# Patient Record
Sex: FEMALE | Race: BLACK OR AFRICAN AMERICAN | NOT HISPANIC OR LATINO | Employment: PART TIME | URBAN - METROPOLITAN AREA
[De-identification: names, ages, dates, MRNs, and addresses within clinical notes are randomized per-mention and may not be internally consistent; named-entity substitution may affect disease eponyms.]

---
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
## 2023-02-23 ENCOUNTER — OFFICE VISIT (OUTPATIENT)
Dept: FAMILY MEDICINE | Facility: CLINIC | Age: 18
End: 2023-02-23
Payer: MEDICAID

## 2023-02-23 VITALS
HEIGHT: 66 IN | RESPIRATION RATE: 20 BRPM | SYSTOLIC BLOOD PRESSURE: 106 MMHG | HEART RATE: 58 BPM | OXYGEN SATURATION: 100 % | BODY MASS INDEX: 20.3 KG/M2 | TEMPERATURE: 98 F | DIASTOLIC BLOOD PRESSURE: 67 MMHG | WEIGHT: 126.31 LBS

## 2023-02-23 DIAGNOSIS — B00.2 ORAL HERPES: ICD-10-CM

## 2023-02-23 DIAGNOSIS — E28.2 PCOS (POLYCYSTIC OVARIAN SYNDROME): ICD-10-CM

## 2023-02-23 DIAGNOSIS — Z00.00 WELLNESS EXAMINATION: Primary | ICD-10-CM

## 2023-02-23 PROBLEM — Q21.12 PATENT FORAMEN OVALE WITH ATRIAL SEPTAL ANEURYSM: Status: RESOLVED | Noted: 2022-11-01 | Resolved: 2023-02-23

## 2023-02-23 PROBLEM — I25.3 PATENT FORAMEN OVALE WITH ATRIAL SEPTAL ANEURYSM: Status: RESOLVED | Noted: 2022-11-01 | Resolved: 2023-02-23

## 2023-02-23 LAB
ALBUMIN SERPL-MCNC: 3.8 G/DL (ref 3.5–5)
ALBUMIN/GLOB SERPL: 1.1 RATIO (ref 1.1–2)
ALP SERPL-CCNC: 53 UNIT/L (ref 40–150)
ALT SERPL-CCNC: 6 UNIT/L (ref 0–55)
APPEARANCE UR: CLEAR
AST SERPL-CCNC: 16 UNIT/L (ref 5–34)
BACTERIA #/AREA URNS AUTO: ABNORMAL /HPF
BASOPHILS # BLD AUTO: 0.06 X10(3)/MCL (ref 0–0.2)
BASOPHILS NFR BLD AUTO: 0.7 %
BILIRUB UR QL STRIP.AUTO: NEGATIVE MG/DL
BILIRUBIN DIRECT+TOT PNL SERPL-MCNC: 0.6 MG/DL
BUN SERPL-MCNC: 9.3 MG/DL (ref 7–18.7)
C TRACH DNA SPEC QL NAA+PROBE: NOT DETECTED
CALCIUM SERPL-MCNC: 9 MG/DL (ref 8.4–10.2)
CHLORIDE SERPL-SCNC: 105 MMOL/L (ref 98–107)
CHOLEST SERPL-MCNC: 152 MG/DL
CHOLEST/HDLC SERPL: 2 {RATIO} (ref 0–5)
CO2 SERPL-SCNC: 25 MMOL/L (ref 22–29)
COLOR UR AUTO: ABNORMAL
CREAT SERPL-MCNC: 0.84 MG/DL (ref 0.55–1.02)
EOSINOPHIL # BLD AUTO: 0.19 X10(3)/MCL (ref 0–0.9)
EOSINOPHIL NFR BLD AUTO: 2.2 %
ERYTHROCYTE [DISTWIDTH] IN BLOOD BY AUTOMATED COUNT: 12.2 % (ref 11.5–17)
EST. AVERAGE GLUCOSE BLD GHB EST-MCNC: 93.9 MG/DL
GFR SERPLBLD CREATININE-BSD FMLA CKD-EPI: >60 MLS/MIN/1.73/M2
GLOBULIN SER-MCNC: 3.4 GM/DL (ref 2.4–3.5)
GLUCOSE SERPL-MCNC: 77 MG/DL (ref 74–100)
GLUCOSE UR QL STRIP.AUTO: NORMAL MG/DL
HAV IGM SERPL QL IA: NONREACTIVE
HBA1C MFR BLD: 4.9 %
HBV CORE IGM SERPL QL IA: NONREACTIVE
HBV SURFACE AG SERPL QL IA: NONREACTIVE
HCT VFR BLD AUTO: 37.9 % (ref 37–47)
HCV AB SERPL QL IA: NONREACTIVE
HDLC SERPL-MCNC: 61 MG/DL (ref 35–60)
HGB BLD-MCNC: 12.3 G/DL (ref 12–16)
HIV 1+2 AB+HIV1 P24 AG SERPL QL IA: NONREACTIVE
HYALINE CASTS #/AREA URNS LPF: ABNORMAL /LPF
IMM GRANULOCYTES # BLD AUTO: 0.02 X10(3)/MCL (ref 0–0.04)
IMM GRANULOCYTES NFR BLD AUTO: 0.2 %
KETONES UR QL STRIP.AUTO: ABNORMAL MG/DL
LDLC SERPL CALC-MCNC: 71 MG/DL (ref 50–140)
LEUKOCYTE ESTERASE UR QL STRIP.AUTO: NEGATIVE UNIT/L
LYMPHOCYTES # BLD AUTO: 1.95 X10(3)/MCL (ref 0.6–4.6)
LYMPHOCYTES NFR BLD AUTO: 22.7 %
MCH RBC QN AUTO: 29.3 PG
MCHC RBC AUTO-ENTMCNC: 32.5 G/DL (ref 33–36)
MCV RBC AUTO: 90.2 FL (ref 80–94)
MONOCYTES # BLD AUTO: 0.45 X10(3)/MCL (ref 0.1–1.3)
MONOCYTES NFR BLD AUTO: 5.2 %
MUCOUS THREADS URNS QL MICRO: ABNORMAL /LPF
N GONORRHOEA DNA SPEC QL NAA+PROBE: NOT DETECTED
NEUTROPHILS # BLD AUTO: 5.91 X10(3)/MCL (ref 2.1–9.2)
NEUTROPHILS NFR BLD AUTO: 69 %
NITRITE UR QL STRIP.AUTO: ABNORMAL
NRBC BLD AUTO-RTO: 0 %
PH UR STRIP.AUTO: 6 [PH]
PLATELET # BLD AUTO: 351 X10(3)/MCL (ref 130–400)
PMV BLD AUTO: 10.1 FL (ref 7.4–10.4)
POTASSIUM SERPL-SCNC: 3.5 MMOL/L (ref 3.5–5.1)
PROT SERPL-MCNC: 7.2 GM/DL (ref 6.4–8.3)
PROT UR QL STRIP.AUTO: NEGATIVE MG/DL
RBC # BLD AUTO: 4.2 X10(6)/MCL (ref 4.2–5.4)
RBC #/AREA URNS AUTO: ABNORMAL /HPF
RBC UR QL AUTO: NEGATIVE UNIT/L
SODIUM SERPL-SCNC: 138 MMOL/L (ref 136–145)
SP GR UR STRIP.AUTO: 1.02
SQUAMOUS #/AREA URNS LPF: ABNORMAL /HPF
T PALLIDUM AB SER QL: NONREACTIVE
T4 FREE SERPL-MCNC: 0.93 NG/DL (ref 0.7–1.48)
TRIGL SERPL-MCNC: 100 MG/DL (ref 37–140)
TSH SERPL-ACNC: 1.68 UIU/ML (ref 0.35–4.94)
UROBILINOGEN UR STRIP-ACNC: NORMAL MG/DL
VLDLC SERPL CALC-MCNC: 20 MG/DL
WBC # SPEC AUTO: 8.6 X10(3)/MCL (ref 4.5–11.5)
WBC #/AREA URNS AUTO: ABNORMAL /HPF

## 2023-02-23 PROCEDURE — 3008F PR BODY MASS INDEX (BMI) DOCUMENTED: ICD-10-PCS | Mod: CPTII,,, | Performed by: STUDENT IN AN ORGANIZED HEALTH CARE EDUCATION/TRAINING PROGRAM

## 2023-02-23 PROCEDURE — 1159F PR MEDICATION LIST DOCUMENTED IN MEDICAL RECORD: ICD-10-PCS | Mod: CPTII,,, | Performed by: STUDENT IN AN ORGANIZED HEALTH CARE EDUCATION/TRAINING PROGRAM

## 2023-02-23 PROCEDURE — 86780 TREPONEMA PALLIDUM: CPT | Performed by: STUDENT IN AN ORGANIZED HEALTH CARE EDUCATION/TRAINING PROGRAM

## 2023-02-23 PROCEDURE — 99395 PREV VISIT EST AGE 18-39: CPT | Mod: S$PBB,,, | Performed by: STUDENT IN AN ORGANIZED HEALTH CARE EDUCATION/TRAINING PROGRAM

## 2023-02-23 PROCEDURE — 99395 PR PREVENTIVE VISIT,EST,18-39: ICD-10-PCS | Mod: S$PBB,,, | Performed by: STUDENT IN AN ORGANIZED HEALTH CARE EDUCATION/TRAINING PROGRAM

## 2023-02-23 PROCEDURE — 81001 URINALYSIS AUTO W/SCOPE: CPT | Performed by: STUDENT IN AN ORGANIZED HEALTH CARE EDUCATION/TRAINING PROGRAM

## 2023-02-23 PROCEDURE — 87591 N.GONORRHOEAE DNA AMP PROB: CPT | Performed by: STUDENT IN AN ORGANIZED HEALTH CARE EDUCATION/TRAINING PROGRAM

## 2023-02-23 PROCEDURE — 3008F BODY MASS INDEX DOCD: CPT | Mod: CPTII,,, | Performed by: STUDENT IN AN ORGANIZED HEALTH CARE EDUCATION/TRAINING PROGRAM

## 2023-02-23 PROCEDURE — 3078F DIAST BP <80 MM HG: CPT | Mod: CPTII,,, | Performed by: STUDENT IN AN ORGANIZED HEALTH CARE EDUCATION/TRAINING PROGRAM

## 2023-02-23 PROCEDURE — 85025 COMPLETE CBC W/AUTO DIFF WBC: CPT | Performed by: STUDENT IN AN ORGANIZED HEALTH CARE EDUCATION/TRAINING PROGRAM

## 2023-02-23 PROCEDURE — 84443 ASSAY THYROID STIM HORMONE: CPT | Performed by: STUDENT IN AN ORGANIZED HEALTH CARE EDUCATION/TRAINING PROGRAM

## 2023-02-23 PROCEDURE — 3074F PR MOST RECENT SYSTOLIC BLOOD PRESSURE < 130 MM HG: ICD-10-PCS | Mod: CPTII,,, | Performed by: STUDENT IN AN ORGANIZED HEALTH CARE EDUCATION/TRAINING PROGRAM

## 2023-02-23 PROCEDURE — 3078F PR MOST RECENT DIASTOLIC BLOOD PRESSURE < 80 MM HG: ICD-10-PCS | Mod: CPTII,,, | Performed by: STUDENT IN AN ORGANIZED HEALTH CARE EDUCATION/TRAINING PROGRAM

## 2023-02-23 PROCEDURE — 99213 OFFICE O/P EST LOW 20 MIN: CPT | Mod: PBBFAC,PN | Performed by: STUDENT IN AN ORGANIZED HEALTH CARE EDUCATION/TRAINING PROGRAM

## 2023-02-23 PROCEDURE — 87389 HIV-1 AG W/HIV-1&-2 AB AG IA: CPT | Performed by: STUDENT IN AN ORGANIZED HEALTH CARE EDUCATION/TRAINING PROGRAM

## 2023-02-23 PROCEDURE — 83036 HEMOGLOBIN GLYCOSYLATED A1C: CPT | Performed by: STUDENT IN AN ORGANIZED HEALTH CARE EDUCATION/TRAINING PROGRAM

## 2023-02-23 PROCEDURE — 80053 COMPREHEN METABOLIC PANEL: CPT | Performed by: STUDENT IN AN ORGANIZED HEALTH CARE EDUCATION/TRAINING PROGRAM

## 2023-02-23 PROCEDURE — 84439 ASSAY OF FREE THYROXINE: CPT | Performed by: STUDENT IN AN ORGANIZED HEALTH CARE EDUCATION/TRAINING PROGRAM

## 2023-02-23 PROCEDURE — 36415 COLL VENOUS BLD VENIPUNCTURE: CPT | Performed by: STUDENT IN AN ORGANIZED HEALTH CARE EDUCATION/TRAINING PROGRAM

## 2023-02-23 PROCEDURE — 1159F MED LIST DOCD IN RCRD: CPT | Mod: CPTII,,, | Performed by: STUDENT IN AN ORGANIZED HEALTH CARE EDUCATION/TRAINING PROGRAM

## 2023-02-23 PROCEDURE — 80074 ACUTE HEPATITIS PANEL: CPT | Performed by: STUDENT IN AN ORGANIZED HEALTH CARE EDUCATION/TRAINING PROGRAM

## 2023-02-23 PROCEDURE — 80061 LIPID PANEL: CPT | Performed by: STUDENT IN AN ORGANIZED HEALTH CARE EDUCATION/TRAINING PROGRAM

## 2023-02-23 PROCEDURE — 3074F SYST BP LT 130 MM HG: CPT | Mod: CPTII,,, | Performed by: STUDENT IN AN ORGANIZED HEALTH CARE EDUCATION/TRAINING PROGRAM

## 2023-02-23 RX ORDER — ACYCLOVIR 400 MG/1
400 TABLET ORAL
Qty: 25 TABLET | Refills: 3 | Status: SHIPPED | OUTPATIENT
Start: 2023-02-23 | End: 2023-02-28

## 2023-02-23 NOTE — PROGRESS NOTES
Patient Name: Britney Urbina   : 2005  MRN: [id number]     Subjective:   Patient ID: Britney Urbina is a 17 y.o. female.    Chief Complaint:   Chief Complaint   Patient presents with    Follow-up     Thinks she has pcos        HPI: HPI  Birth Control- now established with gynecology. Is on OCP's. States that she thinks she has PCOS as cycles were never regular until on OCP's and now having excessive hair growth on chin and between breasts.       Genital Herpes  Diagnosed in [place] in 2020. No current outbreaks  States not interested in STI testing  This visit patient states that the herpes were no genital they were oral.     Syncope  Has has follow up with cardiology and pulm. Episodes are when she does not eat for long periods at a time or drink fluids.  Has been reminded to stay hydrated.       Marijuana use  Patient reports daily marijuana use  Patient declines cessation at this time  States that she does not feel anxious or depressed but admits to having a counselor that she received twila year of high school that she still continues with today  Declines medication or referral for other behavioral health services      ROS:  ROS   History:     Past Medical History:   Diagnosis Date    Herpes simplex virus (HSV) infection       History reviewed. No pertinent surgical history.  Family History   Problem Relation Age of Onset    Breast cancer Maternal Grandmother     Hypertension Father     Diabetes type II Mother     Bipolar disorder Mother     Schizophrenia Mother       Social History     Tobacco Use    Smoking status: Some Days     Types: Vaping with nicotine    Smokeless tobacco: Never   Substance and Sexual Activity    Alcohol use: Yes     Alcohol/week: 1.0 standard drink     Types: 1 Glasses of wine per week     Comment: monthly    Drug use: Yes     Frequency: 7.0 times per week     Types: Marijuana     Comment: daily    Sexual activity: Yes        Allergies: Review of patient's allergies  "indicates:  No Known Allergies  Objective:     Vitals:    02/23/23 0813   BP: 106/67   Pulse: (!) 58   Resp: 20   Temp: 98.1 °F (36.7 °C)   TempSrc: Oral   SpO2: 100%   Weight: 57.3 kg (126 lb 4.8 oz)   Height: 5' 6" (1.676 m)     Body mass index is 20.39 kg/m².     Physical Examination:   Physical Exam  Constitutional:       General: She is not in acute distress.  Eyes:      General: No scleral icterus.     Extraocular Movements: Extraocular movements intact.      Conjunctiva/sclera: Conjunctivae normal.      Pupils: Pupils are equal, round, and reactive to light.   Cardiovascular:      Rate and Rhythm: Normal rate and regular rhythm.      Heart sounds: No murmur heard.  Pulmonary:      Effort: Pulmonary effort is normal. No respiratory distress.      Breath sounds: No stridor. No wheezing or rhonchi.   Abdominal:      General: Bowel sounds are normal. There is no distension.      Palpations: Abdomen is soft.      Tenderness: There is no abdominal tenderness. There is no guarding.   Musculoskeletal:         General: No swelling. Normal range of motion.   Skin:     General: Skin is warm and dry.      Coloration: Skin is not jaundiced.      Findings: No rash.   Neurological:      Mental Status: She is alert.      Gait: Gait normal.   Psychiatric:         Thought Content: Thought content normal.       Assessment:     1. Wellness examination        Plan:     Problem List Items Addressed This Visit          ID    Oral herpes    Overview     A patient prescription for acyclovir 400 mg 5 times a day            Endocrine    PCOS (polycystic ovarian syndrome)    Overview     Patient meets criteria for PCOS with hirsutism and irregular periods  Patient had previously been on spironolactone but is no longer on medication blood pressure is 106/67 running A1c and will discuss with patient potential treatments moving forward  Also encouraged patient to call gynecology            Other    Wellness examination - Primary    " Overview     Labs as below         Relevant Orders    Hepatitis Panel, Acute    SYPHILIS ANTIBODY (WITH REFLEX RPR)    HIV 1/2 Ag/Ab (4th Gen)    CBC Auto Differential    Comprehensive Metabolic Panel    Lipid Panel    T4, Free    TSH    Urinalysis    Hemoglobin A1C    Chlamydia/GC, PCR      Problem List Items Addressed This Visit    None  Visit Diagnoses       Wellness examination    -  Primary    Relevant Orders    Hepatitis Panel, Acute    SYPHILIS ANTIBODY (WITH REFLEX RPR)    HIV 1/2 Ag/Ab (4th Gen)    CBC Auto Differential    Comprehensive Metabolic Panel    Lipid Panel    T4, Free    TSH    Urinalysis    Hemoglobin A1C    Chlamydia/GC, PCR           Follow up in about 2 months (around 4/23/2023) for lab results, Virtual Visit.

## 2023-02-24 LAB — PATH REV: NORMAL

## 2023-03-13 ENCOUNTER — OFFICE VISIT (OUTPATIENT)
Dept: FAMILY MEDICINE | Facility: CLINIC | Age: 18
End: 2023-03-13
Payer: MEDICAID

## 2023-03-13 DIAGNOSIS — R79.89 LOW VITAMIN D LEVEL: Primary | ICD-10-CM

## 2023-03-13 DIAGNOSIS — E28.2 PCOS (POLYCYSTIC OVARIAN SYNDROME): ICD-10-CM

## 2023-03-13 PROCEDURE — 99213 OFFICE O/P EST LOW 20 MIN: CPT | Mod: 95,,, | Performed by: STUDENT IN AN ORGANIZED HEALTH CARE EDUCATION/TRAINING PROGRAM

## 2023-03-13 PROCEDURE — 1159F MED LIST DOCD IN RCRD: CPT | Mod: CPTII,95,, | Performed by: STUDENT IN AN ORGANIZED HEALTH CARE EDUCATION/TRAINING PROGRAM

## 2023-03-13 PROCEDURE — 99213 PR OFFICE/OUTPT VISIT, EST, LEVL III, 20-29 MIN: ICD-10-PCS | Mod: 95,,, | Performed by: STUDENT IN AN ORGANIZED HEALTH CARE EDUCATION/TRAINING PROGRAM

## 2023-03-13 PROCEDURE — 1159F PR MEDICATION LIST DOCUMENTED IN MEDICAL RECORD: ICD-10-PCS | Mod: CPTII,95,, | Performed by: STUDENT IN AN ORGANIZED HEALTH CARE EDUCATION/TRAINING PROGRAM

## 2023-03-13 RX ORDER — ASPIRIN 325 MG
50000 TABLET, DELAYED RELEASE (ENTERIC COATED) ORAL
Qty: 12 CAPSULE | Refills: 0 | Status: SHIPPED | OUTPATIENT
Start: 2023-03-13 | End: 2023-05-30

## 2023-03-14 PROBLEM — R79.89 LOW VITAMIN D LEVEL: Status: ACTIVE | Noted: 2023-03-14

## 2023-03-14 NOTE — PROGRESS NOTES
Audio Only Telehealth Visit     The patient location is: home  The chief complaint leading to consultation is: lab results   Visit type: Virtual visit with audio only (telephone)  Total time spent with patient: 15 minutes   The reason for the audio only service rather than synchronous audio and video virtual visit was related to technical difficulties or patient preference/necessity.     Each patient to whom I provide medical services by telemedicine is:  (1) informed of the relationship between the physician and patient and the respective role of any other health care provider with respect to management of the patient; and (2) notified that they may decline to receive medical services by telemedicine and may withdraw from such care at any time. Patient verbally consented to receive this service via voice-only telephone call.       HPI:   Birth Control- now established with gynecology. Is on OCP's. States that she thinks she has PCOS as cycles were never regular until on OCP's and now having excessive hair growth on chin and between breasts.   A1c is not in insulin resistant range.       Genital Herpes  Diagnosed in [place] in December 2020. No current outbreaks  States not interested in STI testing  Patient now states that the herpes were no genital they were oral.     Syncope  Has has follow up with cardiology and pulm. Episodes are when she does not eat for long periods at a time or drink fluids.  Has been reminded to stay hydrated.         Marijuana use  Patient reports daily marijuana use  Patient declines cessation at this time  States that she does not feel anxious or depressed but admits to having a counselor that she received twila year of high school that she still continues with today  Declines medication or referral for other behavioral health services     Assessment and plan:      Problem List Items Addressed This Visit          Endocrine    PCOS (polycystic ovarian syndrome)    Overview     Patient  meets criteria for PCOS with hirsutism and irregular periods  Patient had previously been on spironolactone but is no longer on medicationReviewed remainder of labs with patient             Other    Low vitamin D level - Primary    Overview     Refilled vitamin D then explained to patient after this round she is to take a MTV         Relevant Medications    cholecalciferol, vitamin D3, 1,250 mcg (50,000 unit) capsule             Follow up in about 1 year (around 3/13/2024) for wellness exam and labs .                This service was not originating from a related E/M service provided within the previous 7 days nor will  to an E/M service or procedure within the next 24 hours or my soonest available appointment.  Prevailing standard of care was able to be met in this audio-only visit.

## 2023-05-29 PROBLEM — Z00.00 WELLNESS EXAMINATION: Status: RESOLVED | Noted: 2023-02-23 | Resolved: 2023-05-29

## 2023-09-26 ENCOUNTER — OFFICE VISIT (OUTPATIENT)
Dept: FAMILY MEDICINE | Facility: CLINIC | Age: 18
End: 2023-09-26
Payer: MEDICAID

## 2023-09-26 VITALS
BODY MASS INDEX: 17.65 KG/M2 | RESPIRATION RATE: 18 BRPM | WEIGHT: 109.81 LBS | SYSTOLIC BLOOD PRESSURE: 102 MMHG | TEMPERATURE: 98 F | HEIGHT: 66 IN | HEART RATE: 77 BPM | DIASTOLIC BLOOD PRESSURE: 66 MMHG | OXYGEN SATURATION: 100 %

## 2023-09-26 DIAGNOSIS — E28.2 PCOS (POLYCYSTIC OVARIAN SYNDROME): ICD-10-CM

## 2023-09-26 DIAGNOSIS — R55 SYNCOPE AND COLLAPSE: Primary | ICD-10-CM

## 2023-09-26 PROCEDURE — 3008F PR BODY MASS INDEX (BMI) DOCUMENTED: ICD-10-PCS | Mod: CPTII,,,

## 2023-09-26 PROCEDURE — 1159F PR MEDICATION LIST DOCUMENTED IN MEDICAL RECORD: ICD-10-PCS | Mod: CPTII,,,

## 2023-09-26 PROCEDURE — 99214 OFFICE O/P EST MOD 30 MIN: CPT | Mod: PBBFAC,PN

## 2023-09-26 PROCEDURE — 3044F HG A1C LEVEL LT 7.0%: CPT | Mod: CPTII,,,

## 2023-09-26 PROCEDURE — 1160F PR REVIEW ALL MEDS BY PRESCRIBER/CLIN PHARMACIST DOCUMENTED: ICD-10-PCS | Mod: CPTII,,,

## 2023-09-26 PROCEDURE — 1159F MED LIST DOCD IN RCRD: CPT | Mod: CPTII,,,

## 2023-09-26 PROCEDURE — 1160F RVW MEDS BY RX/DR IN RCRD: CPT | Mod: CPTII,,,

## 2023-09-26 PROCEDURE — 99214 OFFICE O/P EST MOD 30 MIN: CPT | Mod: S$PBB,,,

## 2023-09-26 PROCEDURE — 3008F BODY MASS INDEX DOCD: CPT | Mod: CPTII,,,

## 2023-09-26 PROCEDURE — 3078F DIAST BP <80 MM HG: CPT | Mod: CPTII,,,

## 2023-09-26 PROCEDURE — 3074F SYST BP LT 130 MM HG: CPT | Mod: CPTII,,,

## 2023-09-26 PROCEDURE — 3078F PR MOST RECENT DIASTOLIC BLOOD PRESSURE < 80 MM HG: ICD-10-PCS | Mod: CPTII,,,

## 2023-09-26 PROCEDURE — 99214 PR OFFICE/OUTPT VISIT, EST, LEVL IV, 30-39 MIN: ICD-10-PCS | Mod: S$PBB,,,

## 2023-09-26 PROCEDURE — 3044F PR MOST RECENT HEMOGLOBIN A1C LEVEL <7.0%: ICD-10-PCS | Mod: CPTII,,,

## 2023-09-26 PROCEDURE — 3074F PR MOST RECENT SYSTOLIC BLOOD PRESSURE < 130 MM HG: ICD-10-PCS | Mod: CPTII,,,

## 2023-09-26 RX ORDER — METFORMIN HYDROCHLORIDE 500 MG/1
TABLET ORAL
Qty: 141 TABLET | Refills: 0 | Status: SHIPPED | OUTPATIENT
Start: 2023-09-26 | End: 2023-11-09

## 2023-09-26 NOTE — ASSESSMENT & PLAN NOTE
Patient states that she has had external workup with Holter monitor, not available for review in chart.  Does endorse syncope surrounded by skipping meals.

## 2023-09-26 NOTE — PROGRESS NOTES
"  Patient Name: Britney Urbina     : 2005    MRN: [id number]     Subjective:     Patient ID: Britney Urbina is a 18 y.o. female.    Chief Complaint:   Chief Complaint   Patient presents with    [facility]     New patient. States "passed out" x1 month ago. States if she doesn't eat anything she passes out. States started in the 7th grade.         HPI: 2023: Episodes began about 13 years prior, occur randomly, states typically surrounding skipping meals. Patient has had prior episodes. There was complete loss of consciousness per patient. The episode was witnessed. Onset was over several seconds. Duration of the episode was a few minutes. There  was not a change of mental status after the event. Preceding/concomitant actions:skipping meals. Symptoms prior to event:lightheadedness and palpitations. There is not a personal history of heart disease e.g. ASHD/CAD, aortic stenosis/other valvular disease. There is not a personal history of pulmonary hypertension. There is not a family history of heart disease e.g. ASHD/CAD, aortic stenosis/other valvular disease. Patient denies chest pain, palpitations, and shortness of breath.  Patient denies fever, night sweats, chills, nausea, vomiting, diarrhea, constipation, weight loss, and changes in appetite.        ROS:       12 point review of systems conducted, negative except as stated in the history of present illness. See HPI for details.    History:     Past Medical History:   Diagnosis Date    Herpes simplex virus (HSV) infection         History reviewed. No pertinent surgical history.    Family History   Problem Relation Age of Onset    Breast cancer Maternal Grandmother     Hypertension Father     Diabetes type II Mother     Bipolar disorder Mother     Schizophrenia Mother         Social History     Tobacco Use    Smoking status: Every Day     Types: Vaping with nicotine    Smokeless tobacco: Never   Substance and Sexual Activity    Alcohol use: Not " "Currently     Alcohol/week: 1.0 standard drink of alcohol     Types: 1 Glasses of wine per week    Drug use: Yes     Frequency: 7.0 times per week     Types: Marijuana     Comment: daily    Sexual activity: Yes     Partners: Male       Current Outpatient Medications   Medication Instructions    acyclovir (ZOVIRAX) 400 mg, Oral, 5 times daily    ESTARYLLA 0.25-35 mg-mcg per tablet 1 tablet, Oral, Daily, Restart on Sunday after cycle.    iron-vit c-b12-folic acid (IRON 100 PLUS) Tab iron Take No date recorded No form recorded No frequency recorded No route recorded No set duration recorded No set duration amount recorded active No dosage strength recorded No dosage strength units of measure recorded    metFORMIN (GLUCOPHAGE) 500 MG tablet Take 1 tablet (500 mg total) by mouth daily with breakfast for 7 days, THEN 1 tablet (500 mg total) 2 (two) times daily with meals for 7 days, THEN 2 tablets (1,000 mg total) 2 (two) times daily with meals.    mirtazapine (REMERON) 15 mg, Oral        Review of patient's allergies indicates:  No Known Allergies    Objective:     Visit Vitals  /66 (BP Location: Left arm, Patient Position: Sitting)   Pulse 77   Temp 98.2 °F (36.8 °C) (Oral)   Resp 18   Ht 5' 6" (1.676 m)   Wt 49.8 kg (109 lb 12.8 oz)   LMP 09/13/2023 (Approximate)   SpO2 100%   BMI 17.72 kg/m²       Physical Examination:     Physical Exam  Vitals reviewed.   Constitutional:       Appearance: Normal appearance. She is normal weight.   HENT:      Head: Normocephalic.      Right Ear: Tympanic membrane, ear canal and external ear normal.      Left Ear: Tympanic membrane, ear canal and external ear normal.      Nose: Nose normal.      Mouth/Throat:      Mouth: Mucous membranes are moist.      Pharynx: Oropharynx is clear.   Eyes:      Extraocular Movements: Extraocular movements intact.      Conjunctiva/sclera: Conjunctivae normal.      Pupils: Pupils are equal, round, and reactive to light.   Cardiovascular:      Rate " and Rhythm: Normal rate and regular rhythm.      Pulses: Normal pulses.      Heart sounds: Normal heart sounds.   Pulmonary:      Effort: Pulmonary effort is normal.      Breath sounds: Normal breath sounds.   Abdominal:      General: Abdomen is flat. Bowel sounds are normal.      Palpations: Abdomen is soft.   Musculoskeletal:         General: Normal range of motion.      Cervical back: Normal range of motion and neck supple.   Skin:     General: Skin is warm and dry.   Neurological:      General: No focal deficit present.      Mental Status: She is alert and oriented to person, place, and time.   Psychiatric:         Mood and Affect: Mood normal.         Behavior: Behavior normal.         Lab Results:     Chemistry:  Lab Results   Component Value Date     02/23/2023    K 3.5 02/23/2023    CHLORIDE 105 02/23/2023    BUN 9.3 02/23/2023    CREATININE 0.84 02/23/2023    EGFRNORACEVR >60 02/23/2023    GLUCOSE 77 02/23/2023    CALCIUM 9.0 02/23/2023    ALKPHOS 53 02/23/2023    LABPROT 7.2 02/23/2023    ALBUMIN 3.8 02/23/2023    BILIDIR 0.3 09/10/2021    IBILI 0.30 09/10/2021    AST 16 02/23/2023    ALT 6 02/23/2023    TSH 1.676 02/23/2023    EQSUXP9XRHD 0.93 02/23/2023        Lab Results   Component Value Date    HGBA1C 4.9 02/23/2023        Hematology:  Lab Results   Component Value Date    WBC 8.6 02/23/2023    HGB 12.3 02/23/2023    HCT 37.9 02/23/2023     02/23/2023       Lipid Panel:  Lab Results   Component Value Date    CHOL 152 02/23/2023    HDL 61 (H) 02/23/2023    LDL 71.00 02/23/2023    TRIG 100 02/23/2023    TOTALCHOLEST 2 02/23/2023        Urine:  Lab Results   Component Value Date    COLORUA Light-Yellow 02/23/2023    APPEARANCEUA Clear 02/23/2023    SGUA 1.016 02/23/2023    PHUA 6.0 02/23/2023    PROTEINUA Negative 02/23/2023    GLUCOSEUA Normal 02/23/2023    KETONESUA Trace (A) 02/23/2023    BLOODUA Negative 02/23/2023    NITRITESUA 2+ (A) 02/23/2023    LEUKOCYTESUR Negative 02/23/2023     RBCUA 0-5 02/23/2023    WBCUA 0-5 02/23/2023    BACTERIA Moderate (A) 02/23/2023    SQEPUA Trace (A) 02/23/2023    HYALINECASTS 0-2 (A) 02/23/2023        Assessment:          ICD-10-CM ICD-9-CM   1. Syncope and collapse  R55 780.2   2. PCOS (polycystic ovarian syndrome)  E28.2 256.4        Plan:     1. Syncope and collapse  Assessment & Plan:  Patient states that she has had external workup with Holter monitor, not available for review in chart.  Does endorse syncope surrounded by skipping meals.    Orders:  -     Glucose Tolerance, 3 Hours; Future; Expected date: 09/26/2023  -     Echo; Future; Expected date: 09/26/2023  -     Holter monitor - 48 hour; Future; Expected date: 09/26/2023  -     EEG; Future; Expected date: 09/26/2023    2. PCOS (polycystic ovarian syndrome)  Overview:  Patient meets criteria for PCOS with hirsutism and irregular periods  Patient had previously been on spironolactone but is no longer on medicationReviewed remainder of labs with patient     Assessment & Plan:  Patient no longer wanting to take birth control, discussed following up with her gynecologist as well as medication alternative.  Previously have tried birth control and spironolactone.  Amenable to trial metformin    Orders:  -     metFORMIN (GLUCOPHAGE) 500 MG tablet; Take 1 tablet (500 mg total) by mouth daily with breakfast for 7 days, THEN 1 tablet (500 mg total) 2 (two) times daily with meals for 7 days, THEN 2 tablets (1,000 mg total) 2 (two) times daily with meals.  Dispense: 141 tablet; Refill: 0         Follow up in about 6 weeks (around 11/7/2023) for Virtual Visit.    Future Appointments   Date Time Provider Department Center   11/1/2023 10:15 AM Patricia Asif PA-C [facility] CARD [place]    11/7/2023 12:45 PM Citlaly Calvert NP [facility]   11/9/2023  8:50 AM Swati Ennis, MARGOT [facility] GYN [place]        Citlaly Calvert NP    I spent a total of 30 minutes on the day of the  visit.This includes face to face time and non-face to face time preparing to see the patient (eg, review of tests), obtaining and/or reviewing separately obtained history, documenting clinical information in the electronic or other health record, independently interpreting results and communicating results to the patient/family/caregiver, or care coordinator.

## 2023-09-26 NOTE — ASSESSMENT & PLAN NOTE
Patient no longer wanting to take birth control, discussed following up with her gynecologist as well as medication alternative.  Previously have tried birth control and spironolactone.  Amenable to trial metformin

## 2023-10-28 ENCOUNTER — HOSPITAL ENCOUNTER (EMERGENCY)
Facility: HOSPITAL | Age: 18
Discharge: HOME | End: 2023-10-28
Attending: INTERNAL MEDICINE
Payer: MEDICAID

## 2023-10-28 VITALS
WEIGHT: 112.88 LBS | BODY MASS INDEX: 18.14 KG/M2 | HEART RATE: 84 BPM | SYSTOLIC BLOOD PRESSURE: 97 MMHG | HEIGHT: 66 IN | DIASTOLIC BLOOD PRESSURE: 67 MMHG | RESPIRATION RATE: 18 BRPM | OXYGEN SATURATION: 100 % | TEMPERATURE: 98 F

## 2023-10-28 DIAGNOSIS — G57.32 NEURALGIA OF LEFT PERONEAL NERVE: Primary | ICD-10-CM

## 2023-10-28 PROCEDURE — 99284 EMERGENCY DEPT VISIT MOD MDM: CPT

## 2023-10-28 RX ORDER — METHYLPREDNISOLONE 4 MG/1
TABLET ORAL
Qty: 21 TABLET | Refills: 0 | Status: SHIPPED | OUTPATIENT
Start: 2023-10-28 | End: 2023-11-18

## 2023-10-28 RX ORDER — DICLOFENAC SODIUM 50 MG/1
50 TABLET, DELAYED RELEASE ORAL 2 TIMES DAILY
Qty: 14 TABLET | Refills: 0 | Status: SHIPPED | OUTPATIENT
Start: 2023-10-28 | End: 2023-11-04

## 2023-10-28 NOTE — ED PROVIDER NOTES
"Encounter Date: 10/28/2023       History     Chief Complaint   Patient presents with    Numbness     Pt reports was sitting on her left foot yesterday at 3pm for a "while". Upon standing foot felt numb. Pt states has not regained feeling in foot. Ambulatory steady gait.      Presents with several hours of numbness of her left foot after she was sitting on the floor with her legs cross for some time while doing makeup and eyelashes. States was fine before lying down but when she stand up noticed the numbness, was expecting to be resolved but still with symptoms. No chronic medical problems.     The history is provided by the patient.     Review of patient's allergies indicates:  No Known Allergies  Past Medical History:   Diagnosis Date    Herpes simplex virus (HSV) infection      No past surgical history on file.  Family History   Problem Relation Age of Onset    Breast cancer Maternal Grandmother     Hypertension Father     Diabetes type II Mother     Bipolar disorder Mother     Schizophrenia Mother      Social History     Tobacco Use    Smoking status: Every Day     Types: Vaping with nicotine    Smokeless tobacco: Never   Substance Use Topics    Alcohol use: Not Currently     Alcohol/week: 1.0 standard drink of alcohol     Types: 1 Glasses of wine per week    Drug use: Yes     Frequency: 7.0 times per week     Types: Marijuana     Comment: daily     Review of Systems   Constitutional:  Negative for fever.   HENT:  Negative for sore throat.    Respiratory:  Negative for shortness of breath.    Cardiovascular:  Negative for chest pain.   Gastrointestinal:  Negative for nausea.   Genitourinary:  Negative for dysuria.   Musculoskeletal:  Negative for back pain.   Skin:  Negative for rash.   Neurological:  Positive for numbness. Negative for weakness.   Hematological:  Does not bruise/bleed easily.   All other systems reviewed and are negative.      Physical Exam     Initial Vitals [10/28/23 1211]   BP Pulse Resp Temp " SpO2   97/67 84 18 98.2 °F (36.8 °C) 100 %      MAP       --         Physical Exam    Nursing note and vitals reviewed.  Constitutional: She appears well-developed and well-nourished. No distress.   HENT:   Head: Normocephalic and atraumatic.   Mouth/Throat: Oropharynx is clear and moist.   Eyes: Conjunctivae are normal. Pupils are equal, round, and reactive to light.   Neck:   Normal range of motion.  Cardiovascular:  Normal rate, regular rhythm, normal heart sounds and intact distal pulses.           Pulmonary/Chest: Breath sounds normal.   Musculoskeletal:         General: No tenderness or edema. Normal range of motion.      Cervical back: Normal range of motion.      Comments: Strength of Lt foot on dorsiflexion 4/5 with decrease sensation, more pronounce on big toe  Dorsal pedis +2, warm and pink toes     Neurological: She is alert and oriented to person, place, and time. She has normal strength. A sensory deficit is present. No cranial nerve deficit. GCS score is 15. GCS eye subscore is 4. GCS verbal subscore is 5. GCS motor subscore is 6.   Skin: Skin is warm and dry. No rash noted. No erythema. No pallor.   Psychiatric: Her behavior is normal.         ED Course   Procedures  Labs Reviewed - No data to display       Imaging Results    None          Medications - No data to display  Medical Decision Making                             Clinical Impression:   Final diagnoses:  [G57.32] Neuralgia of left peroneal nerve (Primary)        ED Disposition Condition    Discharge Stable          ED Prescriptions       Medication Sig Dispense Start Date End Date Auth. Provider    methylPREDNISolone (MEDROL DOSEPACK) 4 mg tablet Follow instructions on package 21 tablet 10/28/2023 11/18/2023 Jack Fairchild MD    diclofenac (VOLTAREN) 50 MG EC tablet Take 1 tablet (50 mg total) by mouth 2 (two) times daily. for 7 days 14 tablet 10/28/2023 11/4/2023 Jack Fairchild MD          Follow-up  Information       Follow up With Specialties Details Why Contact Info    Citlaly Calvert NP Family Medicine In 2 weeks  [address] [facility] [address] [phone]      [facility] Emergency Medicine  If symptoms worsen [address] [facility] [address] [phone]             Jack Fairchild MD  10/28/23 [id number]